# Patient Record
Sex: MALE | Race: WHITE | NOT HISPANIC OR LATINO | Employment: OTHER | URBAN - METROPOLITAN AREA
[De-identification: names, ages, dates, MRNs, and addresses within clinical notes are randomized per-mention and may not be internally consistent; named-entity substitution may affect disease eponyms.]

---

## 2019-01-21 ENCOUNTER — APPOINTMENT (OUTPATIENT)
Dept: LAB | Facility: CLINIC | Age: 39
End: 2019-01-21
Payer: COMMERCIAL

## 2019-01-21 ENCOUNTER — OFFICE VISIT (OUTPATIENT)
Dept: INTERNAL MEDICINE CLINIC | Facility: CLINIC | Age: 39
End: 2019-01-21
Payer: COMMERCIAL

## 2019-01-21 VITALS
BODY MASS INDEX: 27.4 KG/M2 | HEIGHT: 70 IN | SYSTOLIC BLOOD PRESSURE: 126 MMHG | HEART RATE: 90 BPM | WEIGHT: 191.4 LBS | DIASTOLIC BLOOD PRESSURE: 88 MMHG | OXYGEN SATURATION: 96 %

## 2019-01-21 DIAGNOSIS — F52.4 PREMATURE EJACULATION: ICD-10-CM

## 2019-01-21 DIAGNOSIS — R73.01 IMPAIRED FASTING GLUCOSE: ICD-10-CM

## 2019-01-21 DIAGNOSIS — E66.3 OVERWEIGHT (BMI 25.0-29.9): ICD-10-CM

## 2019-01-21 DIAGNOSIS — G47.00 INSOMNIA, UNSPECIFIED TYPE: ICD-10-CM

## 2019-01-21 DIAGNOSIS — Z13.6 SCREENING FOR CARDIOVASCULAR CONDITION: ICD-10-CM

## 2019-01-21 DIAGNOSIS — R35.1 NOCTURIA: ICD-10-CM

## 2019-01-21 DIAGNOSIS — G47.19 DAYTIME HYPERSOMNOLENCE: ICD-10-CM

## 2019-01-21 DIAGNOSIS — R35.1 NOCTURIA: Primary | ICD-10-CM

## 2019-01-21 LAB
ALBUMIN SERPL BCP-MCNC: 4.5 G/DL (ref 3.5–5)
ALP SERPL-CCNC: 74 U/L (ref 46–116)
ALT SERPL W P-5'-P-CCNC: 44 U/L (ref 12–78)
ANION GAP SERPL CALCULATED.3IONS-SCNC: 6 MMOL/L (ref 4–13)
AST SERPL W P-5'-P-CCNC: 33 U/L (ref 5–45)
BILIRUB SERPL-MCNC: 0.34 MG/DL (ref 0.2–1)
BUN SERPL-MCNC: 24 MG/DL (ref 5–25)
CALCIUM SERPL-MCNC: 8.8 MG/DL (ref 8.3–10.1)
CHLORIDE SERPL-SCNC: 102 MMOL/L (ref 100–108)
CHOLEST SERPL-MCNC: 193 MG/DL (ref 50–200)
CO2 SERPL-SCNC: 28 MMOL/L (ref 21–32)
CREAT SERPL-MCNC: 1.4 MG/DL (ref 0.6–1.3)
ERYTHROCYTE [DISTWIDTH] IN BLOOD BY AUTOMATED COUNT: 11.9 % (ref 11.6–15.1)
EST. AVERAGE GLUCOSE BLD GHB EST-MCNC: 128 MG/DL
GFR SERPL CREATININE-BSD FRML MDRD: 64 ML/MIN/1.73SQ M
GLUCOSE SERPL-MCNC: 79 MG/DL (ref 65–140)
HBA1C MFR BLD: 6.1 % (ref 4.2–6.3)
HCT VFR BLD AUTO: 47.6 % (ref 36.5–49.3)
HDLC SERPL-MCNC: 73 MG/DL (ref 40–60)
HGB BLD-MCNC: 16 G/DL (ref 12–17)
LDLC SERPL CALC-MCNC: 91 MG/DL (ref 0–100)
MCH RBC QN AUTO: 31.6 PG (ref 26.8–34.3)
MCHC RBC AUTO-ENTMCNC: 33.6 G/DL (ref 31.4–37.4)
MCV RBC AUTO: 94 FL (ref 82–98)
NONHDLC SERPL-MCNC: 120 MG/DL
PLATELET # BLD AUTO: 230 THOUSANDS/UL (ref 149–390)
PMV BLD AUTO: 10.3 FL (ref 8.9–12.7)
POTASSIUM SERPL-SCNC: 4.6 MMOL/L (ref 3.5–5.3)
PROT SERPL-MCNC: 7.5 G/DL (ref 6.4–8.2)
RBC # BLD AUTO: 5.07 MILLION/UL (ref 3.88–5.62)
SODIUM SERPL-SCNC: 136 MMOL/L (ref 136–145)
TRIGL SERPL-MCNC: 144 MG/DL
WBC # BLD AUTO: 8.81 THOUSAND/UL (ref 4.31–10.16)

## 2019-01-21 PROCEDURE — 85027 COMPLETE CBC AUTOMATED: CPT

## 2019-01-21 PROCEDURE — 99203 OFFICE O/P NEW LOW 30 MIN: CPT | Performed by: INTERNAL MEDICINE

## 2019-01-21 PROCEDURE — 83036 HEMOGLOBIN GLYCOSYLATED A1C: CPT

## 2019-01-21 PROCEDURE — 36415 COLL VENOUS BLD VENIPUNCTURE: CPT

## 2019-01-21 PROCEDURE — 80053 COMPREHEN METABOLIC PANEL: CPT

## 2019-01-21 PROCEDURE — 3725F SCREEN DEPRESSION PERFORMED: CPT | Performed by: INTERNAL MEDICINE

## 2019-01-21 PROCEDURE — 80061 LIPID PANEL: CPT

## 2019-01-21 RX ORDER — NICOTINE 21 MG/24HR
1 PATCH, TRANSDERMAL 24 HOURS TRANSDERMAL EVERY 24 HOURS
COMMUNITY
End: 2021-08-10 | Stop reason: CLARIF

## 2019-01-21 NOTE — PATIENT INSTRUCTIONS
Cholesterol and Your Health   AMBULATORY CARE:   Cholesterol  is a waxy, fat-like substance  Cholesterol is made by your body, but also comes from certain foods you eat  Your body uses cholesterol to make hormones and new cells  Your body also uses cholesterol to protect nerves  Cholesterol comes from foods such as meat and dairy products  Your total cholesterol level is made up by LDL cholesterol, HDL cholesterol, and triglycerides:  · LDL cholesterol  is called bad cholesterol  because it forms plaque in your arteries  As plaque builds up, your arteries become narrow, and less blood flows through  When plaque decreases blood flow to your heart, you may have chest pain  If plaque completely blocks an artery that bring blood to your heart, you may have a heart attack  Plaque can break off and form blood clots  Blood clots may block arteries in your brain and cause a stroke  · HDL cholesterol  is called good cholesterol  because it helps remove LDL cholesterol from your arteries  It does this by attaching to LDL cholesterol and carrying it to your liver  Your liver breaks down LDL cholesterol so your body can get rid of it  High levels of HDL cholesterol can help prevent a heart attack and stroke  Low levels of HDL cholesterol can increase your risk for heart disease, heart attack, and stroke  · Triglycerides  are a type of fat that store energy from foods you eat  High levels of triglycerides also cause plaque buildup  This can increase your risk for a heart attack or stroke  If your triglyceride level is high, your LDL cholesterol level may also be high  How food affects your cholesterol levels:   · Unhealthy fats  increase LDL cholesterol and triglyceride levels in your blood  They are found in foods high in cholesterol, saturated fat, and trans fat:     ¨ Cholesterol  is found in eggs, dairy, and meat  ¨ Saturated fat  is found in butter, cheese, ice cream, whole milk, and coconut oil  Saturated fat is also found in meat, such as sausage, hot dogs, and bologna  ¨ Trans fat  is found in liquid oils and is used in fried and baked foods  Foods that contain trans fats include chips, crackers, muffins, sweet rolls, microwave popcorn, and cookies  · Healthy fats,  also called unsaturated fats, help lower LDL cholesterol and triglyceride levels  Healthy fats include the following:     ¨ Monounsaturated fats  are found in foods such as olive oil, canola oil, avocado, nuts, and olives  ¨ Polyunsaturated fats,  such as omega 3 fats, are found in fish, such as salmon, trout, and tuna  They can also be found in plant foods such as flaxseed, walnuts, and soybeans  Other things that affect your cholesterol levels:   · Smoking cigarettes    · Being overweight or obese     · Drinking large amounts of alcohol    · Not enough exercise or no exercise    · Certain genes passed from your parents to you  What you need to know about having your cholesterol levels checked: Adults 21to 39years of age should have their cholesterol levels checked every 4 to 6 years  Adults 45 years and older should have their cholesterol checked every 1 to 2 years  You may need your cholesterol checked more often, or at a younger age, if you have risk factors for heart disease  You may also need to have your cholesterol checked more often if you have other health conditions, such as diabetes  Blood tests are used to check cholesterol levels  Blood tests measure your levels of triglycerides, LDL cholesterol, and HDL cholesterol  Cholesterol level goals: Your cholesterol level goal may depend on your risk for heart disease  It may also depend on your age and other health conditions  Ask your healthcare provider if the following goals are right for you:  · Your total cholesterol level  should be less than 200 mg/dL  This number may also depend on your HDL and LDL cholesterol goals       · Your LDL cholesterol level  should be less than 130 mg/dL  · Your HDL cholesterol level  should be 60 mg/dL or higher  · Your triglyceride level  should be less than 150 mg/dL  Treatment for high cholesterol:  Treatment for high cholesterol will also decrease your risk of heart disease, heart attack, and stroke  Treatment may include any of the following:  · Medicines  may be given to lower your LDL cholesterol, triglyceride levels, or total cholesterol level  You may need medicines to lower your cholesterol if any of the following is true:     ¨ You have a history of stroke, TIA, unstable angina, or a heart attack    ¨ Your LDL cholesterol level is 190 mg/dL or higher    ¨ You are age 36to 76years of age, have diabetes, and your LDL cholesterol is 70 mg/dL or higher    ¨ You are age 36to 76years of age, have risk factors for heart disease, and your LDL cholesterol is 70 mg/dL or higher    · Lifestyle changes  include changes to your diet, exercise, weight loss, and quitting smoking  It also includes decreasing the amount of alcohol you drink  · Supplements  include fish oil, red yeast rice, and garlic  Fish oil may help lower your triglyceride and LDL cholesterol levels  It may also increase your HDL cholesterol level  Red yeast rice may help decrease your total cholesterol level and LDL cholesterol level  Garlic may help lower your total cholesterol level  Do not take these supplements without talking to your healthcare provider  Nutrition to help lower your cholesterol levels:  A registered dietitian can help you create a healthy eating plan  Read food labels and choose foods low in saturated fat, trans fats, and cholesterol  · Decrease the total amount of fat you eat  Choose lean meats, fat-free or 1% fat milk, and low-fat dairy products, such as yogurt and cheese  Try to limit or avoid red meats  Limit or do not eat fried foods or baked goods such as cookies  · Replace unhealthy fats with healthy fats    Cook foods in olive oil or canola oil  Choose soft margarines that are low in saturated fat and trans fat  Seeds, nuts, and avocados are other examples of healthy fats  · Eat foods with omega-3 fats  Examples include salmon, tuna, mackerel, walnuts, and flaxseed  Eat fish 2 times per week  Children and pregnant women should not eat fish that have high levels of mercury, such as shark, swordfish, and cherise mackerel  · Increase the amount of plant-based foods you eat  Plant-based foods are low in cholesterol and fat  Eating more of these foods may help lower your cholesterol and help you lose weight  Examples of plant-based foods includes fruits, vegetables, legumes, and whole grains  Replace milk that contains dairy with almond, soy, or coconut milk  Eat beans and foods with soy for protein instead of meat  Ask your healthcare provider or dietitian for more information on plant-based foods  · Increase the amount of fiber you eat  High-fiber foods can help lower your LDL cholesterol  You should eat between 20 and 30 grams of fiber each day  Eat at least 5 servings of fruits and vegetables each day  Other examples of high-fiber foods include whole-grain or whole-wheat breads, pastas, or cereals, and brown rice  Eat 3 ounces of whole-grain foods each day  Increase fiber slowly  You may have abdominal discomfort, bloating, and gas if you add fiber to your diet too quickly  Lifestyle changes you can make to help lower your cholesterol levels:   · Maintain a healthy weight  Ask your healthcare provider how much you should weigh  Ask him or her to help you create a weight loss plan if you are overweight  Weight loss can decrease your total cholesterol and triglyceride levels  · Exercise regularly  Exercise can help lower your total cholesterol level and maintain a healthy weight  Exercise can also help increase your HDL cholesterol level   Work with your healthcare provider to create an exercise program that is right for you  Get at least 30 minutes of moderate exercise most days of the week  Examples of exercise include brisk walking, swimming, or biking  · Do not smoke  Nicotine and other chemicals in cigarettes and cigars can damage your lungs, heart, and blood vessels  They can also raise your triglyceride levels  Ask your healthcare provider for information if you currently smoke and need help to quit  E-cigarettes or smokeless tobacco still contain nicotine  Talk to your healthcare provider before you use these products  · Limit or do not drink alcohol  Alcohol can increase your triglyceride levels  Ask your healthcare provider if it is safe for you to drink alcohol  Also ask how much is safe for you to drink each day  © 2017 [facility] Information is for End User's use only and may not be sold, redistributed or otherwise used for commercial purposes  All illustrations and images included in CareNotes® are the copyrighted property of [employer]  or Jak Espino  The above information is an  only  It is not intended as medical advice for individual conditions or treatments  Talk to your doctor, nurse or pharmacist before following any medical regimen to see if it is safe and effective for you

## 2019-01-21 NOTE — PROGRESS NOTES
INTERNAL MEDICINE INITIAL OFFICE VISIT  [facility] [address] [place] [address]    NAME: Lisbeth Balderas  AGE: 41 y o  SEX: male  : 1980     DATE: 2019     Assessment and Plan:     1  Nocturia    Discussed possible causes  Will run basic lab workup  May also need to evaluate for underlying CEASAR with sleep study  - Ambulatory referral to Urology; Future  - CBC; Future  - Comprehensive metabolic panel; Future    2  Premature ejaculation    Referral to urology  Also recommended psychotherapy  Potentially consider use of SSRI therapy  - Ambulatory referral to Urology; Future  - CBC; Future  - Comprehensive metabolic panel; Future    3  Impaired fasting glucose    Evaluate for underlying diabetes  Patient has been consuming more soda lately     - HEMOGLOBIN A1C W/ EAG ESTIMATION; Future  - CBC; Future  - Comprehensive metabolic panel; Future    4  Overweight (BMI 25 0-29  9)    Body mass index is 27 86 kg/m²  Discussed the patient's BMI with him  The BMI is above average  BMI counseling and education was provided to the patient  Nutrition recommendations include reducing portion sizes, decreasing overall calorie intake, 3-5 servings of fruits/vegetables daily and increasing intake of lean protein  Exercise recommendations include moderate aerobic physical activity for 150 minutes/week and exercising 3-5 times per week  5  Screening for cardiovascular condition  - Lipid panel; Future    6  Daytime hypersomnolence  7  Insomnia, unspecified type    Check blood work  Based on results, may consider sleep study  Chief Complaint:     Chief Complaint   Patient presents with    Establish Care     new pt    Insomnia     cannot sleep      History of Present Illness:     41year old male with prior history of substance abuse presents to establish care due to insomnia/nocturia and premature ejaculation       #1 Insomnia - patient states he sleeps poorly because he is getting up around every 2 hours to use the bathroom  He doesn't drink any caffeine or stimulants before bedtime  Tries to stop drinking around 6 pm  Previous alcohol use, but since being clean off drugs, he stopped consuming alcohol as well  No family history of diabetes  Admits since he stopped drinking alcohol, he has been drinking a lot of soda (sprite)  Feels like he empties his bladder fully  No hesitancy or urgency  No prostatitis  Has daytime hypersomnolence, intermittent morning headaches  Unsure if he snores loudly  #2 Premature ejaculation - this is not a new problem for him  Has been going on for several years  Causes him performance anxiety  The following portions of the patient's history were reviewed and updated as appropriate: allergies, current medications, past family history, past medical history, past social history, past surgical history and problem list      Review of Systems:     Review of Systems   Constitutional: Negative for activity change, appetite change and fatigue  Respiratory: Negative for apnea, cough, chest tightness, shortness of breath and wheezing  Cardiovascular: Negative for chest pain, palpitations and leg swelling  Gastrointestinal: Negative for abdominal distention, abdominal pain, blood in stool, constipation, diarrhea, nausea and vomiting  Genitourinary: Negative for difficulty urinating, dysuria, enuresis, frequency and urgency  Nocturia  Premature ejaculation   Musculoskeletal: Negative for arthralgias, back pain, gait problem, joint swelling and myalgias  Skin: Negative for rash and wound  Neurological: Negative for dizziness, weakness, light-headedness, numbness and headaches  Psychiatric/Behavioral: Negative for behavioral problems, confusion, hallucinations, sleep disturbance and suicidal ideas  The patient is not nervous/anxious  Past Medical History:   History reviewed  No pertinent past medical history       Past Surgical History:     Past Surgical History:   Procedure Laterality Date    WISDOM TOOTH EXTRACTION        Social History:   He reports that he has been smoking Cigarettes  He started smoking about 19 years ago  He has been smoking about 1 00 pack per day  He has never used smokeless tobacco  He reports that he does not drink alcohol or use drugs  Family History:     Family History   Problem Relation Age of Onset    Cancer Maternal Grandmother       Current Medications:     Current Outpatient Prescriptions:     nicotine (NICODERM CQ) 14 mg/24hr TD 24 hr patch, Place 1 patch on the skin every 24 hours, Disp: , Rfl:     nicotine (NICODERM CQ) 21 mg/24 hr TD 24 hr patch, Place 1 patch on the skin every 24 hours, Disp: , Rfl:      Allergies:   No Known Allergies     Physical Exam:     /88 (BP Location: Left arm, Patient Position: Sitting, Cuff Size: Standard)   Pulse 90   Ht 5' 9 5" (1 765 m)   Wt 86 8 kg (191 lb 6 4 oz)   SpO2 96%   BMI 27 86 kg/m²     Physical Exam   Constitutional: He is oriented to person, place, and time  He appears well-developed and well-nourished  No distress  Eyes: Conjunctivae are normal  Right eye exhibits no discharge  Left eye exhibits no discharge  No scleral icterus  Neck: Neck supple  No JVD present  No thyromegaly present  Cardiovascular: Normal rate, regular rhythm, normal heart sounds and intact distal pulses  Exam reveals no gallop and no friction rub  No murmur heard  Pulmonary/Chest: Effort normal and breath sounds normal  No respiratory distress  He has no wheezes  He has no rales  He exhibits no tenderness  Abdominal: Soft  Bowel sounds are normal  He exhibits no distension and no mass  There is no tenderness  There is no rebound and no guarding  Musculoskeletal: Normal range of motion  He exhibits no edema  Lymphadenopathy:     He has no cervical adenopathy  Neurological: He is alert and oriented to person, place, and time  Skin: Skin is warm and dry   He is not diaphoretic  Psychiatric: He has a normal mood and affect  His behavior is normal    Vitals reviewed      Danya Quinn DO  [facility]

## 2019-01-22 ENCOUNTER — TELEPHONE (OUTPATIENT)
Dept: INTERNAL MEDICINE CLINIC | Facility: CLINIC | Age: 39
End: 2019-01-22

## 2019-01-22 DIAGNOSIS — G47.19 DAYTIME HYPERSOMNOLENCE: ICD-10-CM

## 2019-01-22 DIAGNOSIS — R35.1 NOCTURIA: Primary | ICD-10-CM

## 2019-01-22 DIAGNOSIS — G47.00 INSOMNIA, UNSPECIFIED TYPE: ICD-10-CM

## 2019-01-22 NOTE — TELEPHONE ENCOUNTER
----- Message from Luis Alvarez DO sent at 1/22/2019  6:54 AM EST -----  Call patient and let him know labs show normal cholesterol, normal electrolytes, and normal complete blood count  He does have evidence of pre-diabetes  A normal A1C is <5 7% and his was 6 1% which is in the pre-diabetes range  Diabetes is when you reach A1C of 6 5%  Do not feel pre-diabetes is causing his nocturia however  I am going to try to get a sleep study approved through his insurance

## 2019-01-22 NOTE — TELEPHONE ENCOUNTER
ROBELI: Dr Nella Gaucher:    Tomer Darby no longer coves Home Sleep Studies; even with an [employer]  You have to order a Diagnostic Sleep Study; CPT code: 77506    Please let me know how you'd like to handle this- order a Diagnostic Sleep Study or refer to a specialist; such as Dr [place] to handle his sleep issues  If you're ordering the Daignostic Study; please put it in the system

## 2019-01-25 ENCOUNTER — TELEPHONE (OUTPATIENT)
Dept: SLEEP CENTER | Facility: CLINIC | Age: 39
End: 2019-01-25

## 2019-01-25 NOTE — TELEPHONE ENCOUNTER
----- Message from Kg Cruz DO sent at 1/24/2019  5:03 PM EST -----  Chart reviewed  Study approved   ----- Message -----  From: Lisbeth Benton  Sent: 1/23/2019  11:45 AM  To: Sleep Medicine Thelma Provider    This sleep study needs approval      If approved please sign and return to clerical pool  If denied please include reasons why  Also provide alternative testing if warranted  Please sign and return to clerical pool

## 2019-01-30 NOTE — PROGRESS NOTES
Problem List Items Addressed This Visit     Premature ejaculation     Patient complains of a longstanding history of reaching climax within 1 5 to 2 minutes, this is also true with masturbation and after initial ejaculation (a refractory period  Does not help with his premature ejaculation)  He has tried the withdrawal technique another behavioral therapies and previously used drugs to increase his ejaculatory latency  He has been cleaning up his behaviors and making positive changes in his life and as such wishes to find a appropriate therapy for this complaint  I spoke to him about off-label use of selective serotonin reuptake inhibitors and the risks and benefits of this approach  He does not wish to use these on a daily basis but will use them on a p r n  Basis  Plan:  Off-label use of paroxetine 40 milligrams on an as-needed basis on the morning of attempted sexual encounters             Relevant Medications    PARoxetine (PAXIL) 40 MG tablet    Nocturia     Patient with nocturia 3-4 times per night, usually awakening every 2 hours  He identifies no aggravating or alleviating factors, this has been occurring for approximately 4 months and is affecting his quality of life  Discussed with patient multifactorial nature of nocturia  Discussed fluid modification, avoidance of bladder irritants especially at night, leg elevation for 1 hour prior to bed with a pre bed void, appropriate timing of medication intake    Discussed the need for multimodal therapy in certain cases of nocturia    Also discussed with patient incorporating exercise as able in addition to incorporating good sleep hygiene    Plan:  I have given the patient a voiding diary, he will continue behavioral changes, he will go for his sleep study to rule out sleep apnea as a contributing factor to his nocturia    Should his voiding diary show nocturnal polyuria, we will investigate not even to increase his 1st uninterrupted interval of sleep         Relevant Orders    POCT Measure PVR (Completed)    Erectile dysfunction due to arterial insufficiency - Primary     Patient has previously used over-the-counter remedies to help with his erectile function  Today we discussed the normal physiology of erection and ejaculation and the complex nature of human sexuality and arousal     We talked about behavioral contribution to poor sexual function including the intake of a poor diet, poor physical activity, and smoking and tobacco use  We talked about the benefits of dietary changes increased physical activity, and avoiding other untoward behaviors which decrease sexual function  With regard to treatments for erectile dysfunction we talked about oral therapies in the form of phosphodiesterase 5 inhibitors, as well as injection therapies with vaso active medications, and surgical therapies with penile prostheses  Specifically, with regard to oral therapies we talked about the need to avoid nitrate medications, the potential interaction with alpha blockers and other blood pressure medications, and the appropriate timing of oral medications approximately 1 hour prior to intercourse on a relatively empty stomach without heavy fatty food or alcohol intake  We talked about the need to take these medications 6-7 times, appropriately, prior to determining treatment failure  We also discussed the potential risks of facial flushing, gastroesophageal reflux symptoms, changes in vision and vision color, and other potential side effects of these medications      Plan:  As needed phosphodiesterase 5 inhibition                 Relevant Medications    sildenafil (REVATIO) 20 mg tablet                Assessment and plan:       Please see problem oriented charting for the assessment plan of today's urological complaints    Yuri Bay MD      Chief Complaint     Chief Complaint   Patient presents with    Nocturia    Erectile dysfunction and concerns  Premature ejaculation    History of Present Illness     Analisa Mcrae is a 41 y o  gentleman referred in consultation by Dr Abril Alcala for the above urologic complaints  A copy of today's consultation is being sent to the referring provider in the name of continuity of care  With regard to his nocturia symptoms going on for approximately 4 months, he awakens every 2 hours to void, and sometimes has difficulties going back to sleep  He has limited is evening fluid intake, he denies regular exercise, and I counseled him on good sleeping hygiene  He is pending a sleep study and this is certainly reasonable given the potential for occult sleep apnea contributing to nocturnal over production of urine  He identifies no aggravating or alleviating factors and no other associated symptoms with regard to this complaint  It is affecting his quality of life negatively  With regard to his fluid intake he does take 2 cups of coffee in the morning as well as some tea throughout the day  He does smoke, for 20 years, [place], menthol cigarettes  With regard his premature ejaculation he states that for quite some time he has ejaculated between 1 5-2 minutes after intromission and he has tried pulling out and the squeeze technique previously without much success  This also occurs when he masturbates  He has had 21 lifetime sexual partners, all female  He states that previously he used to take pain killers to increase his ejaculatory latency but he is in recovery at this time  He does not have an interesting using numbing creams at this time but is interested in using as needed, off-label SSRI therapy for premature ejaculation  At the end of today's visit he did inquire as to some Viagra for erectile troubles and I counseled him on this and taken just medication      On review of systems today he denies dysuria, incontinence, hesitancy of urination, gross hematuria, urgency, nocturia is present as listed above, he feels empty after voiding and stream quality is good  Postvoid residual urine volume is 54 milliliters indicating no evidence of overflow incontinence    The following portions of the patient's history were reviewed and updated as appropriate: allergies, current medications, past family history, past medical history, past social history, past surgical history and problem list         Detailed Urologic History     - please refer to HPI    Review of Systems     Review of Systems   Constitutional: Negative  HENT: Negative  Eyes: Negative  Respiratory: Negative  Cardiovascular: Negative  Gastrointestinal: Negative  Endocrine: Negative  Genitourinary:        As per HPI   Musculoskeletal: Negative  Skin: Negative  Allergic/Immunologic: Negative  Neurological: Negative  Hematological: Negative  Psychiatric/Behavioral: Negative  Allergies     No Known Allergies    Physical Exam     Physical Exam   Constitutional: He is oriented to person, place, and time  He appears well-developed and well-nourished  No distress  HENT:   Head: Normocephalic and atraumatic  Right Ear: External ear normal    Left Ear: External ear normal    Nose: Nose normal    Eyes: Pupils are equal, round, and reactive to light  Conjunctivae and EOM are normal  Right eye exhibits no discharge  Left eye exhibits no discharge  No scleral icterus  Neck: Normal range of motion  Neck supple  Cardiovascular: Regular rhythm and intact distal pulses  Pulmonary/Chest: Effort normal  No stridor  No respiratory distress  He has no wheezes  Abdominal: Soft  He exhibits no distension and no mass  There is no tenderness  There is no rebound and no guarding  No hernia  Genitourinary: Rectum normal, prostate normal and penis normal    Genitourinary Comments: Prostate is 20 grams, smooth, no nodules   Musculoskeletal: He exhibits no edema, tenderness or deformity     Neurological: He is alert and oriented to person, place, and time  No cranial nerve deficit or sensory deficit  He exhibits normal muscle tone  Coordination normal    Skin: Skin is warm and dry  No rash noted  He is not diaphoretic  No erythema  No pallor  Psychiatric: He has a normal mood and affect  His behavior is normal  Judgment and thought content normal    Nursing note and vitals reviewed            Vital Signs  Vitals:    01/31/19 0836   BP: 140/84   BP Location: Left arm   Patient Position: Sitting   Cuff Size: Standard   Pulse: 97   Weight: 86 2 kg (190 lb)   Height: 5' 9 5" (1 765 m)         Current Medications       Current Outpatient Prescriptions:     acetaminophen (TYLENOL) 500 mg tablet, Take 500 mg by mouth every 6 (six) hours as needed, Disp: , Rfl:     nicotine (NICODERM CQ) 14 mg/24hr TD 24 hr patch, Place 1 patch on the skin every 24 hours, Disp: , Rfl:     nicotine (NICODERM CQ) 21 mg/24 hr TD 24 hr patch, Place 1 patch on the skin every 24 hours, Disp: , Rfl:     PARoxetine (PAXIL) 40 MG tablet, Take 1 tablet (40 mg total) by mouth see administration instructions Take daily as needed, Disp: 30 tablet, Rfl: 3    sildenafil (REVATIO) 20 mg tablet, Take 2-5 tablets as needed daily for intercourse, Disp: 60 tablet, Rfl: 6      Active Problems     Patient Active Problem List   Diagnosis    Premature ejaculation    Nocturia    Erectile dysfunction due to arterial insufficiency         Past Medical History     Past Medical History:   Diagnosis Date    Substance abuse ([facility] [address] )          Surgical History     Past Surgical History:   Procedure Laterality Date    WISDOM TOOTH EXTRACTION           Family History     Family History   Problem Relation Age of Onset    Cancer Maternal Grandmother          Social History     Social History     History   Smoking Status    Current Every Day Smoker    Packs/day: 1 00    Types: Cigarettes    Start date: 1/1/2000   Smokeless Tobacco    Never Used         Pertinent Lab Values Lab Results   Component Value Date    CREATININE 1 40 (H) 01/21/2019       No results found for: PSA          Pertinent Imaging      There is no pertinent urological imaging for my review

## 2019-01-30 NOTE — PATIENT INSTRUCTIONS
Premature Ejaculation    Premature ejaculation is the term used when a man comes (ejaculates) more quickly than he and/or his partner would like  It means you ejaculate very soon after putting your penis inside (penetrating) your partner, or even before penetration  It is not really known what causes premature ejaculation  Men with premature ejaculation should not be embarrassed about discussing it with their doctor, as it can be helped by a variety of means  There are tablets which may be helpful, either taken regularly or as needed  Creams or sprays that numb the penis may also be used  Other treatments include certain techniques used during sex, and psychological treatments  Premature ejaculation occurs when you come (achieve orgasm) soon after, or even before, putting your penis inside your partner  Doctors use three features to decide whether a person has premature ejaculation  These are:    Ejaculation occurs always or nearly always within a minute of penetration, and always has done since first having sex  (Or up to three minutes if it is a new problem which you have not previously experienced )  You feel you are always or usually unable to delay ejaculation  You find sex frustrating or distressing and tend to avoid it, or the issue is affecting your relationship or your life  How common is premature ejaculation? Many men do not seek help from their doctor for this problem so it is not known how common it is  Surveys that have been done suggest it affects about one to three out of every one [de-identified] men  Some studies suggest it can be as common as thirty in a [de-identified] men  What causes premature ejaculation? It is not well understood what causes premature ejaculation and in most cases doctors do not know  Sometimes one or more of the following may be a cause      It is more likely to happen if you are young and in the early stages of a relationship, in which case it often gets better with time     Factors such as anxiety about sex or your feelings during your first sexual experience may contribute  Some medicines (eg, cabergoline used for the treatment of illnesses such as Parkinson's disease) can possibly cause premature ejaculation  Premature ejaculation can be caused by some recreational drugs such as cocaine and amphetamines  Persistent infection or inflammation of the prostate gland (chronic prostatitis) is known to be sometimes associated with premature ejaculation  Nervous system diseases, such as multiple sclerosis and peripheral neuropathy, can also be a cause  Multiple sclerosis is a condition resulting from unwinding of the covering of nerves and peripheral neuropathy is a condition where there is damage to the nerves supplying the peripheral nerves of the body  What is the treatment for premature ejaculation? General advice    Don't be embarrassed to discuss the problem with your doctor, either at your surgery or at a sexual health clinic  Premature ejaculation is sometimes temporary and in some cases gets better on its own  You may find that increasing the frequency of sex (either intercourse or masturbation) solves the problem  After one orgasm, it is normal for the next one to take a little longer  Some men find it helpful to masturbate first (with or without their partner) so it takes longer to have an orgasm whilst having sex  Wearing a condom reduces sensation and this may be helpful  Premature ejaculation may be less likely if you have sex with your partner on top    You may want to try the 'squeeze technique'  Just before ejaculation, the head of the penis should be squeezed for 1-20 seconds  Either you or your partner can do this  The squeezing is usually done during masturbation (stimulation) of the penis but also can be done by stopping during intercourse  The squeezing reduces an erection and delays your orgasm   The process must be repeated three times before having an orgasm  It requires a lot of practice  The 'start-stop' technique is similar but you simply stop the stimulation or the intercourse just before ejaculating  Wait for your erection to subside a little, before carrying on  Again, the idea is to repeat three times before having an orgasm  You need practice to recognise the moment just before an orgasm in order to be able to stop in time  Psychological treatments are sometimes used but no one is sure just how effective they are  Studies have shown these techniques can be effective but results are variable  You may prefer to try a cream or a tablet, as discussed below  Medication for premature ejaculation    Various medicines have been found to be helpful  These include:    Selective serotonin reuptake inhibitor (SSRI) antidepressants such as paroxetine, citalopram, escitalopram, fluoxetine and sertraline  You need to take these daily for at least one or two weeks to get the full effect and you may find they start to wear off after 6-12 months  If you cannot tolerate the side-effects of SSRIs (which can include sickness, dizziness and drowsiness), you could try taking another antidepressant called clomipramine just when you are going to have sex  Medicines used for erection problems, such as sildenafil, may sometimes also be useful for premature ejaculation  It is sometimes taken in combination with an SSRI  If you don't want to take tablets, local anaesthetic creams and sprays are available which help to reduce the sensitivity of the penis  Surgery has occasionally been helpful in men who have a short frenulum  This is the bridge of skin joining the head of the penis to the shaft  It is not a common treatment for premature ejaculation  In summary, this is a common problem which often gets better with time and gets better with the above treatments    Do not lose hope as in most cases sexual partners can find ways to satisfy one another and overcome this common issue     What is nocturia? Nocturia is waking up one or more times during the night because of the need to urinate  This means that if you wake up during the night--for instance because you are thirsty, hear noises, worry, or feel pain--and you decide to visit the toilet in the meantime, you do not have nocturia  You also do not suffer from nocturia if you go to the toilet first thing in the morning  Waking up once in a while to urinate is common and is generally not very bothersome  However, if you regularly wake up two or more times a night, it can affect your quality of life and general health  The more times you wake up each night, the more it impacts your wellbeing  Nocturia can disrupt your sleep and may cause you to be more tired than usual during the day  This can make it difficult to concentrate at work and carry out your daily activities  Your lower energy levels could also affect your social life  Interesting Fact:    While nocturia literally means urination at night, it may also occur during the day, for those who work night shifts and sleep in the daytime  How common is nocturia? Nocturia affects both men and women, and becomes more common as you grow older  In adults under 27, more women than men suffer from nocturia while over the age of 48, it affects men more often  Over the age of 61, the chances of suffering from nocturia rapidly increase for men and women alike  What causes nocturia? In some people the kidneys produce too much urine  If the kidneys only overproduce at night, this is called nocturnal polyuria  There are several conditions which can cause overproduction of urine, such as diabetes, or primary polydipsia, the sensation that your mouth is dry which leads you to drink too much      Some people have a smaller bladder which is filled to capacity more quickly and cannot store the urine all night    If you have a bladder or prostate condition, such as benign prostatic enlargement (BPE), you may not be able to empty your bladder completely before going to bed  As a result, the bladder fills more quickly and may not store the urine all night     Other possible causes of nocturia are:    Overactive Bladder    A decrease in the production of the hormone vasopressin (also known as antidiuretic hormone)  Obstructive sleep apnea, or snoring (contraction of the diaphragm pulls on the heart and causes release of atrial natriuretic peptide, a hormone which causes more salt to be excreted in the urine which also creates more urine)  If you snore or wake up gasping for air please ask your primary care doctor about a sleep study to diagnose you with potential sleep apnea  Treatment of sleep apnea with a CPAP machine (a machine that provide continuous positive airway pressure to keep your airway open) can greatly decrease nocturia  Swelling of the ankles and legs, a condition known as peripheral edema    Lower urinary tract symptoms (LUTS)    Congestive heart failure    Treatments:  Fluid modification:  Only drink if you are thirsty after 6 o'clock p m  Avoidance of bladder irritants especially at night:  Alcohol, caffeine, spicy food, acidic food, and certain medications can irritate the bladder and decrease the bladder storage capacity  Leg elevation for 1 hour prior to bed with a pre bedtime void (trip to the bathroom to empty your bladder)    Appropriate timing of medication intake:  Do not take water pills or diuretics at night rather, try taking these at 12 o'clock noon    Incorporating exercise:   You should exercise 4 to 5 times a week for at least 30 minutes enough to raise your heart rate and break a sweat, only do this if your heart is healthy enough for exercise    Incorporate good sleep hygiene:  Sleep in a cool dark room, use and I mask and ear plugs to decrease light and sound pollution, do not exercise within 2-3 hours of trying to go to sleep, take a hot shower or hot bath prior to retiring to bed, the bed is for sleep and sex only, this means do not try to do work or other stimulating activities such as watching television when in bed    The treatment of nocturia is often difficult and multifactorial, meaning that medical therapy, behavioral therapy, and occasionally surgical therapies are necessary to decrease waking up at night to urinate  Many times, nighttime awakening scan be decreased but not eliminated and it is necessary to work with your urologist to find the best treatment regimen for you while also learning to cope with potential persistence of symptoms

## 2019-01-31 ENCOUNTER — OFFICE VISIT (OUTPATIENT)
Dept: UROLOGY | Facility: CLINIC | Age: 39
End: 2019-01-31
Payer: COMMERCIAL

## 2019-01-31 VITALS
DIASTOLIC BLOOD PRESSURE: 84 MMHG | HEART RATE: 97 BPM | SYSTOLIC BLOOD PRESSURE: 140 MMHG | BODY MASS INDEX: 27.2 KG/M2 | HEIGHT: 70 IN | WEIGHT: 190 LBS

## 2019-01-31 DIAGNOSIS — F52.4 PREMATURE EJACULATION: ICD-10-CM

## 2019-01-31 DIAGNOSIS — N52.01 ERECTILE DYSFUNCTION DUE TO ARTERIAL INSUFFICIENCY: Primary | ICD-10-CM

## 2019-01-31 DIAGNOSIS — R35.1 NOCTURIA: ICD-10-CM

## 2019-01-31 LAB — POST-VOID RESIDUAL VOLUME, ML POC: 54 ML

## 2019-01-31 PROCEDURE — 99244 OFF/OP CNSLTJ NEW/EST MOD 40: CPT | Performed by: UROLOGY

## 2019-01-31 PROCEDURE — 51798 US URINE CAPACITY MEASURE: CPT | Performed by: UROLOGY

## 2019-01-31 RX ORDER — SILDENAFIL CITRATE 20 MG/1
TABLET ORAL
Qty: 60 TABLET | Refills: 6 | Status: SHIPPED | OUTPATIENT
Start: 2019-01-31 | End: 2020-07-02

## 2019-01-31 RX ORDER — PAROXETINE HYDROCHLORIDE 40 MG/1
40 TABLET, FILM COATED ORAL SEE ADMIN INSTRUCTIONS
Qty: 30 TABLET | Refills: 3 | Status: SHIPPED | OUTPATIENT
Start: 2019-01-31 | End: 2020-07-02

## 2019-01-31 RX ORDER — ACETAMINOPHEN 500 MG
500 TABLET ORAL EVERY 6 HOURS PRN
COMMUNITY
Start: 2016-08-18 | End: 2021-08-10 | Stop reason: CLARIF

## 2019-01-31 NOTE — ASSESSMENT & PLAN NOTE
Patient complains of a longstanding history of reaching climax within 1 5 to 2 minutes, this is also true with masturbation and after initial ejaculation (a refractory period  Does not help with his premature ejaculation)  He has tried the withdrawal technique another behavioral therapies and previously used drugs to increase his ejaculatory latency  He has been cleaning up his behaviors and making positive changes in his life and as such wishes to find a appropriate therapy for this complaint  I spoke to him about off-label use of selective serotonin reuptake inhibitors and the risks and benefits of this approach  He does not wish to use these on a daily basis but will use them on a p r n  Basis      Plan:  Off-label use of paroxetine 40 milligrams on an as-needed basis on the morning of attempted sexual encounters

## 2019-01-31 NOTE — ASSESSMENT & PLAN NOTE
Patient with nocturia 3-4 times per night, usually awakening every 2 hours  He identifies no aggravating or alleviating factors, this has been occurring for approximately 4 months and is affecting his quality of life  Discussed with patient multifactorial nature of nocturia  Discussed fluid modification, avoidance of bladder irritants especially at night, leg elevation for 1 hour prior to bed with a pre bed void, appropriate timing of medication intake    Discussed the need for multimodal therapy in certain cases of nocturia    Also discussed with patient incorporating exercise as able in addition to incorporating good sleep hygiene    Plan:  I have given the patient a voiding diary, he will continue behavioral changes, he will go for his sleep study to rule out sleep apnea as a contributing factor to his nocturia    Should his voiding diary show nocturnal polyuria, we will investigate not even to increase his 1st uninterrupted interval of sleep

## 2019-01-31 NOTE — LETTER
January 31, 2019     Courtney Kessler DO  [address] [address]    Patient: Grupo Benavides   YOB: 1980   Date of Visit: 1/31/2019       Dear Dr Diego Jacobsen:    Thank you for referring Grupo Benavides to me for evaluation  Below are my notes for this consultation  If you have questions, please do not hesitate to call me  I look forward to following your patient along with you  Sincerely,        Hawa Griffin MD        CC: No Recipients  Hawa Griffin MD  1/31/2019  9:20 AM  Sign at close encounter       Problem List Items Addressed This Visit     Premature ejaculation     Patient complains of a longstanding history of reaching climax within 1 5 to 2 minutes, this is also true with masturbation and after initial ejaculation (a refractory period  Does not help with his premature ejaculation)  He has tried the withdrawal technique another behavioral therapies and previously used drugs to increase his ejaculatory latency  He has been cleaning up his behaviors and making positive changes in his life and as such wishes to find a appropriate therapy for this complaint  I spoke to him about off-label use of selective serotonin reuptake inhibitors and the risks and benefits of this approach  He does not wish to use these on a daily basis but will use them on a p r n  Basis  Plan:  Off-label use of paroxetine 40 milligrams on an as-needed basis on the morning of attempted sexual encounters             Relevant Medications    PARoxetine (PAXIL) 40 MG tablet    Nocturia     Patient with nocturia 3-4 times per night, usually awakening every 2 hours  He identifies no aggravating or alleviating factors, this has been occurring for approximately 4 months and is affecting his quality of life  Discussed with patient multifactorial nature of nocturia      Discussed fluid modification, avoidance of bladder irritants especially at night, leg elevation for 1 hour prior to bed with a pre bed void, appropriate timing of medication intake    Discussed the need for multimodal therapy in certain cases of nocturia    Also discussed with patient incorporating exercise as able in addition to incorporating good sleep hygiene    Plan:  I have given the patient a voiding diary, he will continue behavioral changes, he will go for his sleep study to rule out sleep apnea as a contributing factor to his nocturia  Should his voiding diary show nocturnal polyuria, we will investigate not even to increase his 1st uninterrupted interval of sleep         Relevant Orders    POCT Measure PVR (Completed)    Erectile dysfunction due to arterial insufficiency - Primary     Patient has previously used over-the-counter remedies to help with his erectile function  Today we discussed the normal physiology of erection and ejaculation and the complex nature of human sexuality and arousal     We talked about behavioral contribution to poor sexual function including the intake of a poor diet, poor physical activity, and smoking and tobacco use  We talked about the benefits of dietary changes increased physical activity, and avoiding other untoward behaviors which decrease sexual function  With regard to treatments for erectile dysfunction we talked about oral therapies in the form of phosphodiesterase 5 inhibitors, as well as injection therapies with vaso active medications, and surgical therapies with penile prostheses  Specifically, with regard to oral therapies we talked about the need to avoid nitrate medications, the potential interaction with alpha blockers and other blood pressure medications, and the appropriate timing of oral medications approximately 1 hour prior to intercourse on a relatively empty stomach without heavy fatty food or alcohol intake  We talked about the need to take these medications 6-7 times, appropriately, prior to determining treatment failure      We also discussed the potential risks of facial flushing, gastroesophageal reflux symptoms, changes in vision and vision color, and other potential side effects of these medications  Plan:  As needed phosphodiesterase 5 inhibition                 Relevant Medications    sildenafil (REVATIO) 20 mg tablet                Assessment and plan:       Please see problem oriented charting for the assessment plan of today's urological complaints    Laura Jansen MD      Chief Complaint     Chief Complaint   Patient presents with    Nocturia    Erectile dysfunction and concerns  Premature ejaculation    History of Present Illness     Sylvia Loaiza is a 41 y o  gentleman referred in consultation by Dr Becca Li for the above urologic complaints  A copy of today's consultation is being sent to the referring provider in the name of continuity of care  With regard to his nocturia symptoms going on for approximately 4 months, he awakens every 2 hours to void, and sometimes has difficulties going back to sleep  He has limited is evening fluid intake, he denies regular exercise, and I counseled him on good sleeping hygiene  He is pending a sleep study and this is certainly reasonable given the potential for occult sleep apnea contributing to nocturnal over production of urine  He identifies no aggravating or alleviating factors and no other associated symptoms with regard to this complaint  It is affecting his quality of life negatively  With regard to his fluid intake he does take 2 cups of coffee in the morning as well as some tea throughout the day  He does smoke, for 20 years, [place], menthol cigarettes  With regard his premature ejaculation he states that for quite some time he has ejaculated between 1 5-2 minutes after intromission and he has tried pulling out and the squeeze technique previously without much success  This also occurs when he masturbates    He has had 20 lifetime sexual partners, all female  He states that previously he used to take pain killers to increase his ejaculatory latency but he is in recovery at this time  He does not have an interesting using numbing creams at this time but is interested in using as needed, off-label SSRI therapy for premature ejaculation  At the end of today's visit he did inquire as to some Viagra for erectile troubles and I counseled him on this and taken just medication  On review of systems today he denies dysuria, incontinence, hesitancy of urination, gross hematuria, urgency, nocturia is present as listed above, he feels empty after voiding and stream quality is good  Postvoid residual urine volume is 54 milliliters indicating no evidence of overflow incontinence    The following portions of the patient's history were reviewed and updated as appropriate: allergies, current medications, past family history, past medical history, past social history, past surgical history and problem list         Detailed Urologic History     - please refer to HPI    Review of Systems     Review of Systems   Constitutional: Negative  HENT: Negative  Eyes: Negative  Respiratory: Negative  Cardiovascular: Negative  Gastrointestinal: Negative  Endocrine: Negative  Genitourinary:        As per HPI   Musculoskeletal: Negative  Skin: Negative  Allergic/Immunologic: Negative  Neurological: Negative  Hematological: Negative  Psychiatric/Behavioral: Negative  Allergies     No Known Allergies    Physical Exam     Physical Exam   Constitutional: He is oriented to person, place, and time  He appears well-developed and well-nourished  No distress  HENT:   Head: Normocephalic and atraumatic  Right Ear: External ear normal    Left Ear: External ear normal    Nose: Nose normal    Eyes: Pupils are equal, round, and reactive to light  Conjunctivae and EOM are normal  Right eye exhibits no discharge  Left eye exhibits no discharge  No scleral icterus  Neck: Normal range of motion  Neck supple  Cardiovascular: Regular rhythm and intact distal pulses  Pulmonary/Chest: Effort normal  No stridor  No respiratory distress  He has no wheezes  Abdominal: Soft  He exhibits no distension and no mass  There is no tenderness  There is no rebound and no guarding  No hernia  Genitourinary: Rectum normal, prostate normal and penis normal    Genitourinary Comments: Prostate is 20 grams, smooth, no nodules   Musculoskeletal: He exhibits no edema, tenderness or deformity  Neurological: He is alert and oriented to person, place, and time  No cranial nerve deficit or sensory deficit  He exhibits normal muscle tone  Coordination normal    Skin: Skin is warm and dry  No rash noted  He is not diaphoretic  No erythema  No pallor  Psychiatric: He has a normal mood and affect  His behavior is normal  Judgment and thought content normal    Nursing note and vitals reviewed            Vital Signs  Vitals:    01/31/19 0836   BP: 140/84   BP Location: Left arm   Patient Position: Sitting   Cuff Size: Standard   Pulse: 97   Weight: 86 2 kg (190 lb)   Height: 5' 9 5" (1 765 m)         Current Medications       Current Outpatient Prescriptions:     acetaminophen (TYLENOL) 500 mg tablet, Take 500 mg by mouth every 6 (six) hours as needed, Disp: , Rfl:     nicotine (NICODERM CQ) 14 mg/24hr TD 24 hr patch, Place 1 patch on the skin every 24 hours, Disp: , Rfl:     nicotine (NICODERM CQ) 21 mg/24 hr TD 24 hr patch, Place 1 patch on the skin every 24 hours, Disp: , Rfl:     PARoxetine (PAXIL) 40 MG tablet, Take 1 tablet (40 mg total) by mouth see administration instructions Take daily as needed, Disp: 30 tablet, Rfl: 3    sildenafil (REVATIO) 20 mg tablet, Take 2-5 tablets as needed daily for intercourse, Disp: 60 tablet, Rfl: 6      Active Problems     Patient Active Problem List   Diagnosis    Premature ejaculation    Nocturia    Erectile dysfunction due to arterial insufficiency         Past Medical History     Past Medical History:   Diagnosis Date    Substance abuse [facility])          Surgical History     Past Surgical History:   Procedure Laterality Date    WISDOM TOOTH EXTRACTION           Family History     Family History   Problem Relation Age of Onset    Cancer Maternal Grandmother          Social History     Social History     History   Smoking Status    Current Every Day Smoker    Packs/day: 1 00    Types: Cigarettes    Start date: 1/1/2000   Smokeless Tobacco    Never Used         Pertinent Lab Values     Lab Results   Component Value Date    CREATININE 1 40 (H) 01/21/2019       No results found for: PSA          Pertinent Imaging      There is no pertinent urological imaging for my review

## 2019-01-31 NOTE — ASSESSMENT & PLAN NOTE
Patient has previously used over-the-counter remedies to help with his erectile function  Today we discussed the normal physiology of erection and ejaculation and the complex nature of human sexuality and arousal     We talked about behavioral contribution to poor sexual function including the intake of a poor diet, poor physical activity, and smoking and tobacco use  We talked about the benefits of dietary changes increased physical activity, and avoiding other untoward behaviors which decrease sexual function  With regard to treatments for erectile dysfunction we talked about oral therapies in the form of phosphodiesterase 5 inhibitors, as well as injection therapies with vaso active medications, and surgical therapies with penile prostheses  Specifically, with regard to oral therapies we talked about the need to avoid nitrate medications, the potential interaction with alpha blockers and other blood pressure medications, and the appropriate timing of oral medications approximately 1 hour prior to intercourse on a relatively empty stomach without heavy fatty food or alcohol intake  We talked about the need to take these medications 6-7 times, appropriately, prior to determining treatment failure  We also discussed the potential risks of facial flushing, gastroesophageal reflux symptoms, changes in vision and vision color, and other potential side effects of these medications      Plan:  As needed phosphodiesterase 5 inhibition

## 2020-07-02 ENCOUNTER — OFFICE VISIT (OUTPATIENT)
Dept: INTERNAL MEDICINE CLINIC | Facility: CLINIC | Age: 40
End: 2020-07-02
Payer: COMMERCIAL

## 2020-07-02 VITALS
DIASTOLIC BLOOD PRESSURE: 74 MMHG | SYSTOLIC BLOOD PRESSURE: 120 MMHG | HEART RATE: 70 BPM | BODY MASS INDEX: 25.36 KG/M2 | WEIGHT: 174.2 LBS | RESPIRATION RATE: 18 BRPM | TEMPERATURE: 98.2 F

## 2020-07-02 DIAGNOSIS — R19.7 DIARRHEA, UNSPECIFIED TYPE: Primary | ICD-10-CM

## 2020-07-02 PROCEDURE — 4004F PT TOBACCO SCREEN RCVD TLK: CPT | Performed by: PHYSICIAN ASSISTANT

## 2020-07-02 PROCEDURE — 99213 OFFICE O/P EST LOW 20 MIN: CPT | Performed by: PHYSICIAN ASSISTANT

## 2020-07-02 NOTE — PROGRESS NOTES
Assessment/Plan:  He was given a note to go back to work GI symptoms have resolved physical exam unremarkable       Diagnoses and all orders for this visit:    Diarrhea, unspecified type        No problem-specific Assessment & Plan notes found for this encounter  BMI Counseling: BMI was not able to be calculated due to patient refusing height and/or weight  Depression Screening and Follow-up Plan: Patient not screened for depression due to patient refusal           Subjective:      Patient ID: Analisa Mcrae is a 46 y o  male  He had diarrhea some abdominal cramping for the past 3 days completely resolved as of last night currently feels well completely asymptomatic eating and drinking without any problem no fever chills shortness of breath or coughing  He is trying to quit smoking      The following portions of the patient's history were reviewed and updated as appropriate:   He has a past medical history of Substance abuse ([facility] [address] )  ,  does not have any pertinent problems on file  ,   has a past surgical history that includes [place] tooth extraction  ,  family history includes Cancer in his maternal grandmother  ,   reports that he has been smoking cigarettes  He started smoking about 20 years ago  He has been smoking about 1 00 pack per day  He has never used smokeless tobacco  He reports that he does not drink alcohol or use drugs  ,  has No Known Allergies     Current Outpatient Medications   Medication Sig Dispense Refill    nicotine (NICODERM CQ) 14 mg/24hr TD 24 hr patch Place 1 patch on the skin every 24 hours      nicotine (NICODERM CQ) 21 mg/24 hr TD 24 hr patch Place 1 patch on the skin every 24 hours      acetaminophen (TYLENOL) 500 mg tablet Take 500 mg by mouth every 6 (six) hours as needed       No current facility-administered medications for this visit          Review of Systems   Constitutional: Negative for activity change, appetite change, chills, diaphoresis, fatigue, fever and unexpected weight change  HENT: Negative for congestion, dental problem, drooling, ear discharge, ear pain, facial swelling, hearing loss, nosebleeds, postnasal drip, rhinorrhea, sinus pressure, sinus pain, sneezing, sore throat, tinnitus, trouble swallowing and voice change  Eyes: Negative for photophobia, pain, discharge, redness, itching and visual disturbance  Respiratory: Negative for apnea, cough, choking, chest tightness, shortness of breath, wheezing and stridor  Cardiovascular: Negative for chest pain, palpitations and leg swelling  Gastrointestinal: Negative for abdominal distention, abdominal pain, anal bleeding, blood in stool, constipation, diarrhea, nausea, rectal pain and vomiting  Endocrine: Negative for cold intolerance, heat intolerance, polydipsia, polyphagia and polyuria  Genitourinary: Negative for decreased urine volume, difficulty urinating, dysuria, enuresis, flank pain, frequency, genital sores, hematuria and urgency  Musculoskeletal: Negative for arthralgias, back pain, gait problem, joint swelling, myalgias, neck pain and neck stiffness  Skin: Negative for color change, pallor, rash and wound  Neurological: Negative for dizziness, tremors, seizures, syncope, facial asymmetry, speech difficulty, weakness, light-headedness, numbness and headaches  Hematological: Negative for adenopathy  Does not bruise/bleed easily  Psychiatric/Behavioral: Negative for agitation, behavioral problems, confusion, decreased concentration, dysphoric mood, hallucinations, self-injury, sleep disturbance and suicidal ideas  The patient is not nervous/anxious and is not hyperactive  Objective:  Vitals:    07/02/20 1600   BP: 120/74   Pulse: 70   Resp: 18   Temp: 98 2 °F (36 8 °C)   Weight: 79 kg (174 lb 3 2 oz)     Body mass index is 25 36 kg/m²  Physical Exam   Constitutional: He is oriented to person, place, and time  He appears well-developed  HENT:   Head: Normocephalic  Right Ear: External ear normal    Left Ear: External ear normal    Mouth/Throat: Oropharynx is clear and moist    Eyes: Pupils are equal, round, and reactive to light  Conjunctivae are normal    Neck: Neck supple  No thyromegaly present  Cardiovascular: Normal rate, regular rhythm, normal heart sounds and intact distal pulses  Pulmonary/Chest: Effort normal and breath sounds normal    Abdominal: Soft  Bowel sounds are normal    Musculoskeletal: Normal range of motion  Neurological: He is alert and oriented to person, place, and time  He has normal reflexes  Skin: Skin is warm and dry

## 2020-07-02 NOTE — LETTER
July 2, 2020     Patient: Kevin Munoz   YOB: 1980   Date of Visit: 7/2/2020       To Whom it May Concern:    Kevin Munoz is under my professional care  He was seen in my office on 7/2/2020  He may return to work on 7/3/20  If you have any questions or concerns, please don't hesitate to call           Sincerely,          Rakesh Arriaga PA-C        CC: Kevin Munoz

## 2021-02-10 DIAGNOSIS — F52.4 PREMATURE EJACULATION: Primary | ICD-10-CM

## 2021-02-10 DIAGNOSIS — N52.01 ERECTILE DYSFUNCTION DUE TO ARTERIAL INSUFFICIENCY: ICD-10-CM

## 2021-02-10 NOTE — TELEPHONE ENCOUNTER
Medication Refill Request patient next appt for 6/2 with Adrienne     Patient calling to request a refill of PARoxetine (PAXIL) 40 MG tablet and  sildenafil (REVATIO) 20 mg tablet to be sent to : [facility]/pharmacy   [address] [place] [id number]    Phone:  [phone]   patient can be reached at [phone]

## 2021-02-11 DIAGNOSIS — N52.01 ERECTILE DYSFUNCTION DUE TO ARTERIAL INSUFFICIENCY: ICD-10-CM

## 2021-02-11 RX ORDER — PAROXETINE HYDROCHLORIDE 40 MG/1
TABLET, FILM COATED ORAL
Qty: 30 TABLET | Refills: 1 | Status: SHIPPED | OUTPATIENT
Start: 2021-02-11 | End: 2021-08-10 | Stop reason: SDUPTHER

## 2021-02-11 RX ORDER — SILDENAFIL CITRATE 20 MG/1
TABLET ORAL
Qty: 90 TABLET | Refills: 1 | Status: SHIPPED | OUTPATIENT
Start: 2021-02-11 | End: 2021-02-12

## 2021-02-11 NOTE — TELEPHONE ENCOUNTER
The patient has an upcoming office visit scheduled for 6/2/21 with Marcela Bender PA-C in the [facility] location but will run out of medication until then  The pharmacy the patient requested is CLOSED and service is now being provided at the nearby [facility]/pharmacy at [address] [place] [address] in [place], [place]    Requests for both medications were queued and forwarded to the Advanced Practitioner covering the [facility] location for approval

## 2021-02-12 RX ORDER — SILDENAFIL CITRATE 20 MG/1
TABLET ORAL
Qty: 90 TABLET | Refills: 1 | Status: SHIPPED | OUTPATIENT
Start: 2021-02-12 | End: 2021-03-02 | Stop reason: CLARIF

## 2021-02-25 NOTE — TELEPHONE ENCOUNTER
Patient left a message on the Medication Refill voice mail line stating the office needs to approve the refill

## 2021-02-26 RX ORDER — SILDENAFIL 50 MG/1
TABLET, FILM COATED ORAL
Qty: 30 TABLET | Refills: 3 | Status: SHIPPED | OUTPATIENT
Start: 2021-02-26 | End: 2021-08-10 | Stop reason: SDUPTHER

## 2021-02-26 NOTE — TELEPHONE ENCOUNTER
I reached out to the patient to clarify his request   He states his insurance isn't covering it and it requires prior authorization  I explained to the patient that performance enhancing medications are NOT covered by Medical Assistance  He confirmed that he uses 50 to 60mg worth of drug per encounter and would prefer that dosage moving forward  We discussed cost-effective pricing and various pharmacy vendors  Margarito recommended for best cash pay pricing  Cost of #30 count supply of Sildenafil 50mg to [facility] is $13 46  GoodRx coupon was included on the prescription  The patient has an upcoming office visit scheduled for 6/2/21 with Jake Portillo PA-C in the [facility] location but will run out of medication until then    Request for same, 30 tablets supply with 3 refills was queued and forwarded to the Advanced Practitioner covering the [facility] location for approval

## 2021-08-10 ENCOUNTER — APPOINTMENT (OUTPATIENT)
Dept: LAB | Facility: CLINIC | Age: 41
End: 2021-08-10
Payer: COMMERCIAL

## 2021-08-10 ENCOUNTER — OFFICE VISIT (OUTPATIENT)
Dept: INTERNAL MEDICINE CLINIC | Facility: CLINIC | Age: 41
End: 2021-08-10
Payer: COMMERCIAL

## 2021-08-10 VITALS
DIASTOLIC BLOOD PRESSURE: 78 MMHG | OXYGEN SATURATION: 94 % | BODY MASS INDEX: 25.77 KG/M2 | WEIGHT: 180 LBS | SYSTOLIC BLOOD PRESSURE: 120 MMHG | HEIGHT: 70 IN | TEMPERATURE: 98.4 F | HEART RATE: 45 BPM

## 2021-08-10 DIAGNOSIS — Z00.00 ADULT GENERAL MEDICAL EXAMINATION: Primary | ICD-10-CM

## 2021-08-10 DIAGNOSIS — Z11.59 NEED FOR HEPATITIS C SCREENING TEST: ICD-10-CM

## 2021-08-10 DIAGNOSIS — Z11.4 SCREENING FOR HIV (HUMAN IMMUNODEFICIENCY VIRUS): ICD-10-CM

## 2021-08-10 DIAGNOSIS — Z13.6 SCREENING FOR CARDIOVASCULAR CONDITION: ICD-10-CM

## 2021-08-10 DIAGNOSIS — Z00.00 ADULT GENERAL MEDICAL EXAMINATION: ICD-10-CM

## 2021-08-10 DIAGNOSIS — F52.4 PREMATURE EJACULATION: ICD-10-CM

## 2021-08-10 DIAGNOSIS — N52.01 ERECTILE DYSFUNCTION DUE TO ARTERIAL INSUFFICIENCY: ICD-10-CM

## 2021-08-10 LAB
ANION GAP SERPL CALCULATED.3IONS-SCNC: 5 MMOL/L (ref 4–13)
BUN SERPL-MCNC: 27 MG/DL (ref 5–25)
CALCIUM SERPL-MCNC: 9.8 MG/DL (ref 8.3–10.1)
CHLORIDE SERPL-SCNC: 103 MMOL/L (ref 100–108)
CHOLEST SERPL-MCNC: 217 MG/DL (ref 50–200)
CO2 SERPL-SCNC: 31 MMOL/L (ref 21–32)
CREAT SERPL-MCNC: 1.16 MG/DL (ref 0.6–1.3)
ERYTHROCYTE [DISTWIDTH] IN BLOOD BY AUTOMATED COUNT: 12 % (ref 11.6–15.1)
GFR SERPL CREATININE-BSD FRML MDRD: 79 ML/MIN/1.73SQ M
GLUCOSE P FAST SERPL-MCNC: 82 MG/DL (ref 65–99)
HCT VFR BLD AUTO: 45.2 % (ref 36.5–49.3)
HCV AB SER QL: NORMAL
HDLC SERPL-MCNC: 104 MG/DL
HGB BLD-MCNC: 14.8 G/DL (ref 12–17)
LDLC SERPL CALC-MCNC: 98 MG/DL (ref 0–100)
MCH RBC QN AUTO: 31.5 PG (ref 26.8–34.3)
MCHC RBC AUTO-ENTMCNC: 32.7 G/DL (ref 31.4–37.4)
MCV RBC AUTO: 96 FL (ref 82–98)
NONHDLC SERPL-MCNC: 113 MG/DL
PLATELET # BLD AUTO: 245 THOUSANDS/UL (ref 149–390)
PMV BLD AUTO: 10 FL (ref 8.9–12.7)
POTASSIUM SERPL-SCNC: 4.9 MMOL/L (ref 3.5–5.3)
RBC # BLD AUTO: 4.7 MILLION/UL (ref 3.88–5.62)
SODIUM SERPL-SCNC: 139 MMOL/L (ref 136–145)
TRIGL SERPL-MCNC: 75 MG/DL
WBC # BLD AUTO: 8.71 THOUSAND/UL (ref 4.31–10.16)

## 2021-08-10 PROCEDURE — 80048 BASIC METABOLIC PNL TOTAL CA: CPT

## 2021-08-10 PROCEDURE — 87389 HIV-1 AG W/HIV-1&-2 AB AG IA: CPT

## 2021-08-10 PROCEDURE — 4004F PT TOBACCO SCREEN RCVD TLK: CPT | Performed by: INTERNAL MEDICINE

## 2021-08-10 PROCEDURE — 3008F BODY MASS INDEX DOCD: CPT | Performed by: INTERNAL MEDICINE

## 2021-08-10 PROCEDURE — 3725F SCREEN DEPRESSION PERFORMED: CPT | Performed by: INTERNAL MEDICINE

## 2021-08-10 PROCEDURE — 36415 COLL VENOUS BLD VENIPUNCTURE: CPT

## 2021-08-10 PROCEDURE — 80061 LIPID PANEL: CPT

## 2021-08-10 PROCEDURE — 85027 COMPLETE CBC AUTOMATED: CPT

## 2021-08-10 PROCEDURE — 86803 HEPATITIS C AB TEST: CPT

## 2021-08-10 PROCEDURE — 99395 PREV VISIT EST AGE 18-39: CPT | Performed by: INTERNAL MEDICINE

## 2021-08-10 RX ORDER — PAROXETINE HYDROCHLORIDE 40 MG/1
TABLET, FILM COATED ORAL
Qty: 30 TABLET | Refills: 3 | Status: SHIPPED | OUTPATIENT
Start: 2021-08-10

## 2021-08-10 RX ORDER — SILDENAFIL 50 MG/1
TABLET, FILM COATED ORAL
Qty: 30 TABLET | Refills: 3 | Status: SHIPPED | OUTPATIENT
Start: 2021-08-10

## 2021-08-10 NOTE — PROGRESS NOTES
ADULT ANNUAL PHYSICAL   [address]    NAME: Raúl Orr  AGE: 45 y o  SEX: male  : 1980     DATE: 8/10/2021     Assessment and Plan:     Problem List Items Addressed This Visit        Cardiovascular and Mediastinum    Erectile dysfunction due to arterial insufficiency    Relevant Medications    sildenafil (VIAGRA) 50 MG tablet       Other    Premature ejaculation    Relevant Medications    PARoxetine (PAXIL) 40 MG tablet      Other Visit Diagnoses     Adult general medical examination    -  Primary    Relevant Orders    CBC    Basic metabolic panel    Screening for cardiovascular condition        Relevant Orders    Lipid panel    Screening for HIV (human immunodeficiency virus)        Relevant Orders    HIV 1/2 Antigen/Antibody (4th Generation) w Reflex [facility]    Need for hepatitis C screening test        Relevant Orders    Hepatitis C antibody        Discussed COVID-19 vaccine with him  He is open to get vaccinated and he can set up an appt with [facility]  Will check some basic labs  Medication refills sent to pharmacy  Immunizations and preventive care screenings were discussed with patient today  Appropriate education was printed on patient's after visit summary  Counseling:  Alcohol/drug use: discussed moderation in alcohol intake, the recommendations for healthy alcohol use, and avoidance of illicit drug use  Dental Health: discussed importance of regular tooth brushing, flossing, and dental visits  Injury prevention: discussed safety/seat belts, safety helmets, smoke detectors, carbon dioxide detectors, and smoking near bedding or upholstery  · Sexual health: discussed sexually transmitted diseases, partner selection, use of condoms, avoidance of unintended pregnancy, and contraceptive alternatives  BMI Counseling: Body mass index is 26 2 kg/m²   The BMI is above normal  Nutrition recommendations include decreasing portion sizes, encouraging healthy choices of fruits and vegetables, limiting drinks that contain sugar, moderation in carbohydrate intake and increasing intake of lean protein  Exercise recommendations include exercising 3-5 times per week  Return in about 1 year (around 8/11/2022) for Follow-up  Chief Complaint:     Chief Complaint   Patient presents with    Follow-up      History of Present Illness:     Adult Annual Physical   Patient here for a comprehensive physical exam  The patient reports no problems  I haven't seen him since 2019  Due for lab work  Has been able to work during COVID-23  Needs some medication refills  Has known history of ED/premature ejaculation  Has been evaluated by Urology in past  He hasn't got COVID-19 vaccine yet but he is open to it  Diet and Physical Activity  · Diet/Nutrition: well balanced diet  Depression Screening  PHQ-9 Depression Screening    PHQ-9:   Frequency of the following problems over the past two weeks:      Little interest or pleasure in doing things: 0 - not at all  Feeling down, depressed, or hopeless: 0 - not at all  PHQ-2 Score: 0       General Health  · Sleep: sleeps well  · Hearing: normal - bilateral   · Vision: no vision problems  · Dental: no dental visits for >1 year and brushes teeth once daily  Review of Systems:     Review of Systems   Constitutional: Negative for appetite change, chills, fatigue and fever  HENT: Negative for congestion, hearing loss, postnasal drip, rhinorrhea, sore throat, tinnitus and trouble swallowing  Eyes: Negative for pain, discharge, redness and visual disturbance  Respiratory: Negative for cough, chest tightness, shortness of breath and wheezing  Cardiovascular: Negative for chest pain, palpitations and leg swelling  Gastrointestinal: Negative for abdominal distention, abdominal pain, blood in stool, constipation, diarrhea, nausea and vomiting     Endocrine: Negative for cold intolerance, heat intolerance, polydipsia, polyphagia and polyuria  Genitourinary: Negative for difficulty urinating, dysuria, frequency, hematuria and urgency  Musculoskeletal: Negative for arthralgias, back pain, gait problem, joint swelling, myalgias, neck pain and neck stiffness  Skin: Negative for color change and rash  Neurological: Negative for dizziness, tremors, seizures, syncope, speech difficulty, weakness, light-headedness, numbness and headaches  Hematological: Negative for adenopathy  Does not bruise/bleed easily  Psychiatric/Behavioral: Positive for behavioral problems (difficulty with focus)  Negative for agitation, confusion, hallucinations, sleep disturbance and suicidal ideas  The patient is not nervous/anxious  Past Medical History:     Past Medical History:   Diagnosis Date    Substance abuse ([facility] [address] )       Past Surgical History:     Past Surgical History:   Procedure Laterality Date    WISDOM TOOTH EXTRACTION        Social History:     Social History     Socioeconomic History    Marital status: Single     Spouse name: None    Number of children: None    Years of education: None    Highest education level: None   Occupational History    None   Tobacco Use    Smoking status: Current Every Day Smoker     Packs/day: 1 00     Types: Cigarettes     Start date: 1/1/2000    Smokeless tobacco: Never Used   Vaping Use    Vaping Use: Every day    Substances: Nicotine   Substance and Sexual Activity    Alcohol use: No    Drug use: No    Sexual activity: Yes     Partners: Female   Other Topics Concern    None   Social History Narrative    None     Social Determinants of Health     Financial Resource Strain:     Difficulty of Paying Living Expenses:    Food Insecurity:     Worried About Running Out of Food in the Last Year:     Ran Out of Food in the Last Year:    Transportation Needs:     Lack of Transportation (Medical):      Lack of Transportation (Non-Medical): Physical Activity:     Days of Exercise per Week:     Minutes of Exercise per Session:    Stress:     Feeling of Stress :    Social Connections:     Frequency of Communication with Friends and Family:     Frequency of Social Gatherings with Friends and Family:     Attends Jewish Services:     Active Member of Clubs or Organizations:     Attends Club or Organization Meetings:     Marital Status:    Intimate Partner Violence:     Fear of Current or Ex-Partner:     Emotionally Abused:     Physically Abused:     Sexually Abused:       Family History:     Family History   Problem Relation Age of Onset    Cancer Maternal Grandmother       Current Medications:     Current Outpatient Medications   Medication Sig Dispense Refill    PARoxetine (PAXIL) 40 MG tablet Take 1 tablet by mouth PRN prior to intercourse  30 tablet 3    sildenafil (VIAGRA) 50 MG tablet Take 1 to 2 tablets by mouth one (1) hour prior to intercourse on an empty stomach  30 tablet 3     No current facility-administered medications for this visit  Allergies:     No Known Allergies   Physical Exam:     /78   Pulse (!) 45   Temp 98 4 °F (36 9 °C)   Ht 5' 9 5" (1 765 m)   Wt 81 6 kg (180 lb)   SpO2 94%   BMI 26 20 kg/m²     Physical Exam  Vitals reviewed  Constitutional:       General: He is not in acute distress  Appearance: He is well-developed  He is not diaphoretic  HENT:      Right Ear: Tympanic membrane, ear canal and external ear normal  There is no impacted cerumen  Left Ear: Tympanic membrane, ear canal and external ear normal  There is no impacted cerumen  Eyes:      General: No scleral icterus  Right eye: No discharge  Left eye: No discharge  Conjunctiva/sclera: Conjunctivae normal    Neck:      Thyroid: No thyromegaly  Vascular: No JVD  Cardiovascular:      Rate and Rhythm: Normal rate and regular rhythm  Heart sounds: Normal heart sounds  No murmur heard  Pulmonary:      Effort: Pulmonary effort is normal  No respiratory distress  Breath sounds: Normal breath sounds  No wheezing or rales  Abdominal:      General: Bowel sounds are normal  There is no distension  Palpations: Abdomen is soft  Tenderness: There is no abdominal tenderness  Musculoskeletal:      Cervical back: Neck supple  Right lower leg: No edema  Left lower leg: No edema  Lymphadenopathy:      Cervical: No cervical adenopathy  Skin:     General: Skin is warm and dry  Neurological:      Mental Status: He is alert     Psychiatric:         Mood and Affect: Mood normal          Behavior: Behavior normal           Hailey Oreilly DO   MEDICAL [id number]

## 2021-08-10 NOTE — PATIENT INSTRUCTIONS

## 2021-08-11 ENCOUNTER — TELEPHONE (OUTPATIENT)
Dept: INTERNAL MEDICINE CLINIC | Facility: CLINIC | Age: 41
End: 2021-08-11

## 2021-08-11 LAB — HIV 1+2 AB+HIV1 P24 AG SERPL QL IA: NORMAL

## 2021-08-11 NOTE — TELEPHONE ENCOUNTER
----- Message from Carlito Zamudio DO sent at 8/11/2021  1:28 PM EDT -----  Labs look good   No significant abnormalities

## 2021-08-20 ENCOUNTER — IMMUNIZATIONS (OUTPATIENT)
Dept: FAMILY MEDICINE CLINIC | Facility: HOSPITAL | Age: 41
End: 2021-08-20

## 2021-08-20 DIAGNOSIS — Z23 ENCOUNTER FOR IMMUNIZATION: Primary | ICD-10-CM

## 2021-08-20 PROCEDURE — 0001A SARS-COV-2 / COVID-19 MRNA VACCINE (PFIZER-BIONTECH) 30 MCG: CPT

## 2021-08-20 PROCEDURE — 91300 SARS-COV-2 / COVID-19 MRNA VACCINE (PFIZER-BIONTECH) 30 MCG: CPT

## 2021-09-17 ENCOUNTER — IMMUNIZATIONS (OUTPATIENT)
Dept: FAMILY MEDICINE CLINIC | Facility: HOSPITAL | Age: 41
End: 2021-09-17

## 2021-09-17 DIAGNOSIS — Z23 ENCOUNTER FOR IMMUNIZATION: Primary | ICD-10-CM

## 2021-09-17 PROCEDURE — 0002A SARS-COV-2 / COVID-19 MRNA VACCINE (PFIZER-BIONTECH) 30 MCG: CPT

## 2021-09-17 PROCEDURE — 91300 SARS-COV-2 / COVID-19 MRNA VACCINE (PFIZER-BIONTECH) 30 MCG: CPT

## 2022-11-16 ENCOUNTER — OFFICE VISIT (OUTPATIENT)
Dept: INTERNAL MEDICINE CLINIC | Facility: CLINIC | Age: 42
End: 2022-11-16

## 2022-11-16 ENCOUNTER — APPOINTMENT (OUTPATIENT)
Dept: LAB | Facility: CLINIC | Age: 42
End: 2022-11-16

## 2022-11-16 VITALS
SYSTOLIC BLOOD PRESSURE: 98 MMHG | DIASTOLIC BLOOD PRESSURE: 58 MMHG | HEART RATE: 79 BPM | OXYGEN SATURATION: 95 % | WEIGHT: 192.8 LBS | BODY MASS INDEX: 27.6 KG/M2 | HEIGHT: 70 IN | TEMPERATURE: 99.7 F

## 2022-11-16 DIAGNOSIS — K30 INDIGESTION: Primary | ICD-10-CM

## 2022-11-16 DIAGNOSIS — K30 INDIGESTION: ICD-10-CM

## 2022-11-16 LAB
ALBUMIN SERPL BCP-MCNC: 3.8 G/DL (ref 3.5–5)
ALP SERPL-CCNC: 76 U/L (ref 46–116)
ALT SERPL W P-5'-P-CCNC: 37 U/L (ref 12–78)
ANION GAP SERPL CALCULATED.3IONS-SCNC: 1 MMOL/L (ref 4–13)
AST SERPL W P-5'-P-CCNC: 30 U/L (ref 5–45)
BASOPHILS # BLD AUTO: 0.08 THOUSANDS/ÂΜL (ref 0–0.1)
BASOPHILS NFR BLD AUTO: 1 % (ref 0–1)
BILIRUB SERPL-MCNC: 0.32 MG/DL (ref 0.2–1)
BUN SERPL-MCNC: 28 MG/DL (ref 5–25)
CALCIUM SERPL-MCNC: 9.2 MG/DL (ref 8.3–10.1)
CHLORIDE SERPL-SCNC: 107 MMOL/L (ref 96–108)
CHOLEST SERPL-MCNC: 155 MG/DL
CO2 SERPL-SCNC: 30 MMOL/L (ref 21–32)
CREAT SERPL-MCNC: 1.3 MG/DL (ref 0.6–1.3)
EOSINOPHIL # BLD AUTO: 0.42 THOUSAND/ÂΜL (ref 0–0.61)
EOSINOPHIL NFR BLD AUTO: 6 % (ref 0–6)
ERYTHROCYTE [DISTWIDTH] IN BLOOD BY AUTOMATED COUNT: 12.2 % (ref 11.6–15.1)
GFR SERPL CREATININE-BSD FRML MDRD: 67 ML/MIN/1.73SQ M
GLUCOSE P FAST SERPL-MCNC: 79 MG/DL (ref 65–99)
HCT VFR BLD AUTO: 42.5 % (ref 36.5–49.3)
HDLC SERPL-MCNC: 75 MG/DL
HGB BLD-MCNC: 13.7 G/DL (ref 12–17)
IMM GRANULOCYTES # BLD AUTO: 0.02 THOUSAND/UL (ref 0–0.2)
IMM GRANULOCYTES NFR BLD AUTO: 0 % (ref 0–2)
LDLC SERPL CALC-MCNC: 65 MG/DL (ref 0–100)
LYMPHOCYTES # BLD AUTO: 2.07 THOUSANDS/ÂΜL (ref 0.6–4.47)
LYMPHOCYTES NFR BLD AUTO: 29 % (ref 14–44)
MCH RBC QN AUTO: 31.1 PG (ref 26.8–34.3)
MCHC RBC AUTO-ENTMCNC: 32.2 G/DL (ref 31.4–37.4)
MCV RBC AUTO: 97 FL (ref 82–98)
MONOCYTES # BLD AUTO: 0.52 THOUSAND/ÂΜL (ref 0.17–1.22)
MONOCYTES NFR BLD AUTO: 7 % (ref 4–12)
NEUTROPHILS # BLD AUTO: 4.06 THOUSANDS/ÂΜL (ref 1.85–7.62)
NEUTS SEG NFR BLD AUTO: 57 % (ref 43–75)
NRBC BLD AUTO-RTO: 0 /100 WBCS
PLATELET # BLD AUTO: 287 THOUSANDS/UL (ref 149–390)
PMV BLD AUTO: 10 FL (ref 8.9–12.7)
POTASSIUM SERPL-SCNC: 4.6 MMOL/L (ref 3.5–5.3)
PROT SERPL-MCNC: 6.6 G/DL (ref 6.4–8.4)
RBC # BLD AUTO: 4.4 MILLION/UL (ref 3.88–5.62)
SODIUM SERPL-SCNC: 138 MMOL/L (ref 135–147)
TRIGL SERPL-MCNC: 73 MG/DL
WBC # BLD AUTO: 7.17 THOUSAND/UL (ref 4.31–10.16)

## 2022-11-16 RX ORDER — DEXTROAMPHETAMINE SACCHARATE, AMPHETAMINE ASPARTATE MONOHYDRATE, DEXTROAMPHETAMINE SULFATE AND AMPHETAMINE SULFATE 6.25; 6.25; 6.25; 6.25 MG/1; MG/1; MG/1; MG/1
25 CAPSULE, EXTENDED RELEASE ORAL EVERY MORNING
COMMUNITY
Start: 2022-11-12

## 2022-11-16 RX ORDER — BUPRENORPHINE AND NALOXONE 2; .5 MG/1; MG/1
FILM, SOLUBLE BUCCAL; SUBLINGUAL
COMMUNITY
Start: 2022-11-11

## 2022-11-16 RX ORDER — OMEPRAZOLE 20 MG/1
20 CAPSULE, DELAYED RELEASE ORAL DAILY
Qty: 30 CAPSULE | Refills: 3 | Status: SHIPPED | OUTPATIENT
Start: 2022-11-16

## 2022-11-16 RX ORDER — DEXTROAMPHETAMINE SACCHARATE, AMPHETAMINE ASPARTATE, DEXTROAMPHETAMINE SULFATE AND AMPHETAMINE SULFATE 2.5; 2.5; 2.5; 2.5 MG/1; MG/1; MG/1; MG/1
TABLET ORAL
COMMUNITY
Start: 2022-11-11

## 2022-11-16 NOTE — PROGRESS NOTES
INTERNAL MEDICINE FOLLOW-UP VISIT  [facility] [address] [place] [address]    NAME: Lisbeth Balderas  AGE: 40 y o  SEX: male  : 1980     DATE: 2022     Assessment and Plan:   1  Indigestion  Occurs after eating only, resolves with TUMS  Limit spicy foods  Limit eating late at night  Limit caffeine, chocolate, and ibuprofen use  Will start trial of Omeprazole 20 mg daily  Discussed frequency, dosing, side effects, and mechanism of action with patient  Two thousand nineteen patient had a 6 1 hemoglobin A1c will do labs to follow up with this  - HEMOGLOBIN A1C W/ EAG ESTIMATION; Future  - CBC and differential; Future  - Comprehensive metabolic panel; Future  - Lipid Panel with Direct LDL reflex; Future  - omeprazole (PriLOSEC) 20 mg delayed release capsule; Take 1 capsule (20 mg total) by mouth daily  Dispense: 30 capsule; Refill: 3        No follow-ups on file  Chief Complaint:     Chief Complaint   Patient presents with   • Heartburn     Indigestion started about 3 weeks ago and the patient stated he took Tums but it only helped a little bit  History of Present Illness:     Patient is here today with ongoing complaints of “indigestion”  He states that this occurs mainly after eating and it is random with no trending of food intake  Patient denies any nausea, vomiting, diarrhea  He states that the symptoms of indigestion are resolved with use of Tums  He has not tried any other medications at this time  He denies shortness of breath, chest pain, shoulder or jaw pain  The following portions of the patient's history were reviewed and updated as appropriate: allergies, current medications, past family history, past medical history, past social history, past surgical history and problem list      Review of Systems:     Review of Systems   Constitutional: Negative for appetite change, chills, diaphoresis, fatigue, fever and unexpected weight change     HENT: Negative for postnasal drip and sneezing  Eyes: Negative for visual disturbance  Respiratory: Negative for chest tightness and shortness of breath  Cardiovascular: Negative for chest pain, palpitations and leg swelling  Gastrointestinal: Positive for abdominal pain  Negative for blood in stool  Endocrine: Negative for cold intolerance, heat intolerance, polydipsia, polyphagia and polyuria  Genitourinary: Negative for difficulty urinating, dysuria, frequency and urgency  Musculoskeletal: Negative for arthralgias and myalgias  Skin: Negative for rash and wound  Neurological: Negative for dizziness, weakness, light-headedness and headaches  Hematological: Negative for adenopathy  Psychiatric/Behavioral: Negative for confusion, dysphoric mood and sleep disturbance  The patient is not nervous/anxious           Past Medical History:     Past Medical History:   Diagnosis Date   • [facility]         Current Medications:     Current Outpatient Medications:   •  amphetamine-dextroamphetamine (ADDERALL XR) 25 MG 24 hr capsule, Take 25 mg by mouth every morning, Disp: , Rfl:   •  amphetamine-dextroamphetamine (ADDERALL) 10 mg tablet, take 1 tablet by mouth AT 4PM, Disp: , Rfl:   •  buprenorphine-naloxone (Suboxone) 2-0 5 mg, DISSOLVE 1 AND A HALF FILMS UDNER THE TONGUE DAILY, Disp: , Rfl:   •  omeprazole (PriLOSEC) 20 mg delayed release capsule, Take 1 capsule (20 mg total) by mouth daily, Disp: 30 capsule, Rfl: 3  •  sildenafil (VIAGRA) 50 MG tablet, Take 1 to 2 tablets by mouth one (1) hour prior to intercourse on an empty stomach , Disp: 30 tablet, Rfl: 3     Allergies:   No Known Allergies     Physical Exam:     BP 98/58 (BP Location: Left arm, Patient Position: Sitting, Cuff Size: Standard) Comment: bp  Pulse 79   Temp 99 7 °F (37 6 °C) (Temporal) Comment: no Comment (Src): no  Ht 5' 9 5" (1 765 m)   Wt 87 5 kg (192 lb 12 8 oz)   SpO2 95%   BMI 28 06 kg/m²     Physical Exam  Constitutional:       Appearance: He is well-developed and well-nourished  HENT:      Head: Normocephalic and atraumatic  Eyes:      Conjunctiva/sclera: Conjunctivae normal       Pupils: Pupils are equal, round, and reactive to light  Cardiovascular:      Rate and Rhythm: Normal rate and regular rhythm  Heart sounds: Normal heart sounds  Pulmonary:      Effort: Pulmonary effort is normal       Breath sounds: Normal breath sounds  Abdominal:      General: Bowel sounds are normal       Palpations: Abdomen is soft  Musculoskeletal:         General: Normal range of motion  Cervical back: Normal range of motion  Skin:     General: Skin is warm and dry  Neurological:      Mental Status: He is alert and oriented to person, place, and time            Data:     Laboratory Results: I have personally reviewed the pertinent laboratory results/reports     Kelly Bell, [address] [place] [address]

## 2022-11-17 ENCOUNTER — TELEPHONE (OUTPATIENT)
Dept: INTERNAL MEDICINE CLINIC | Facility: CLINIC | Age: 42
End: 2022-11-17

## 2022-11-17 LAB
EST. AVERAGE GLUCOSE BLD GHB EST-MCNC: 111 MG/DL
HBA1C MFR BLD: 5.5 %

## 2022-11-17 NOTE — TELEPHONE ENCOUNTER
----- Message from [id number] sent at 11/17/2022 12:41 PM EST -----  Labs are all normal

## 2023-11-01 ENCOUNTER — OFFICE VISIT (OUTPATIENT)
Dept: UROLOGY | Facility: CLINIC | Age: 43
End: 2023-11-01
Payer: COMMERCIAL

## 2023-11-01 VITALS
BODY MASS INDEX: 27.4 KG/M2 | WEIGHT: 191.4 LBS | SYSTOLIC BLOOD PRESSURE: 124 MMHG | HEART RATE: 87 BPM | DIASTOLIC BLOOD PRESSURE: 76 MMHG | OXYGEN SATURATION: 99 % | HEIGHT: 70 IN

## 2023-11-01 DIAGNOSIS — F52.4 PREMATURE EJACULATION: Primary | ICD-10-CM

## 2023-11-01 PROCEDURE — 99203 OFFICE O/P NEW LOW 30 MIN: CPT | Performed by: PHYSICIAN ASSISTANT

## 2023-11-01 RX ORDER — TRAZODONE HYDROCHLORIDE 150 MG/1
150 TABLET ORAL
COMMUNITY
Start: 2023-08-06 | End: 2023-11-01 | Stop reason: ALTCHOICE

## 2023-11-01 RX ORDER — PAROXETINE HYDROCHLORIDE 40 MG/1
TABLET, FILM COATED ORAL
Qty: 30 TABLET | Refills: 2 | Status: SHIPPED | OUTPATIENT
Start: 2023-11-01

## 2023-11-01 RX ORDER — QUETIAPINE FUMARATE 200 MG/1
200 TABLET, FILM COATED ORAL
COMMUNITY
Start: 2023-10-25

## 2023-11-01 NOTE — PROGRESS NOTES
11/1/2023      Chief Complaint   Patient presents with    New Patient Visit     Premature ejaculation     Assessment and Plan    Premature ejaculation   - Previously did well with prn Paxil  - Rx refill for Paxil 40 mg 3-4 hours prior to intercourse provided. - Follow up PRN, can have refilled through PCP as long as this remains effective. History of Present Illness  Evangelina Sales is a 44 y.o. male here for new patient evaluation of premature ejaculation. Previously was seen for this same issue in 2019. Previously failed Emla cream, viagra, and behavioral modifications in the past. Was utilizing as needed paxil in the past with good benefit and would like to restart this. Review of Systems   Constitutional:  Negative for chills and fever. Respiratory:  Negative for shortness of breath. Cardiovascular:  Negative for chest pain. Gastrointestinal:  Negative for abdominal pain. Genitourinary:  Negative for difficulty urinating, dysuria, flank pain, frequency, hematuria and urgency. Neurological:  Negative for dizziness.                   Past Medical History  Past Medical History:   Diagnosis Date    Substance abuse [facility])        Past Social History  Past Surgical History:   Procedure Laterality Date    WISDOM TOOTH EXTRACTION       Social History     Tobacco Use   Smoking Status Every Day    Packs/day: 1.00    Types: Cigarettes    Start date: 1/1/2000   Smokeless Tobacco Never       Past Family History  Family History   Problem Relation Age of Onset    Cancer Maternal Grandmother        Past Social history  Social History     Socioeconomic History    Marital status: Single     Spouse name: Not on file    Number of children: Not on file    Years of education: Not on file    Highest education level: Not on file   Occupational History    Not on file   Tobacco Use    Smoking status: Every Day     Packs/day: 1.00     Types: Cigarettes     Start date: 1/1/2000    Smokeless tobacco: Never   Vaping Use    Vaping Use: Every day    Substances: Nicotine   Substance and Sexual Activity    Alcohol use: No    Drug use: No    Sexual activity: Yes     Partners: Female   Other Topics Concern    Not on file   Social History Narrative    Not on file     Social Determinants of Health     Financial Resource Strain: Not on file   Food Insecurity: Not on file   Transportation Needs: Not on file   Physical Activity: Not on file   Stress: Not on file   Social Connections: Not on file   Intimate Partner Violence: Not on file   Housing Stability: Not on file       Current Medications  Current Outpatient Medications   Medication Sig Dispense Refill    amphetamine-dextroamphetamine (ADDERALL XR) 25 MG 24 hr capsule Take 25 mg by mouth every morning      amphetamine-dextroamphetamine (ADDERALL) 10 mg tablet take 1 tablet by mouth AT 4PM      buprenorphine-naloxone (Suboxone) 2-0.5 mg DISSOLVE 1 AND A HALF FILMS UDNER THE TONGUE DAILY      omeprazole (PriLOSEC) 20 mg delayed release capsule Take 1 capsule (20 mg total) by mouth daily 30 capsule 3    PARoxetine (PAXIL) 40 MG tablet Take 1 tablet by mouth 3-4 hours prior to intercourse PRN 30 tablet 2    QUEtiapine (SEROquel) 200 mg tablet Take 200 mg by mouth daily at bedtime      sildenafil (VIAGRA) 50 MG tablet Take 1 to 2 tablets by mouth one (1) hour prior to intercourse on an empty stomach. 30 tablet 3     No current facility-administered medications for this visit. Allergies  No Known Allergies      The following portions of the patient's history were reviewed and updated as appropriate: allergies, current medications, past medical history, past social history, past surgical history and problem list.      Vitals  Vitals:    11/01/23 1149   BP: 124/76   Pulse: 87   SpO2: 99%   Weight: 86.8 kg (191 lb 6.4 oz)   Height: 5' 9.5" (1.765 m)           Physical Exam  Physical Exam  Constitutional:       Appearance: Normal appearance.    HENT:      Head: Normocephalic and atraumatic. Right Ear: External ear normal.      Left Ear: External ear normal.      Nose: Nose normal.   Eyes:      General: No scleral icterus. Conjunctiva/sclera: Conjunctivae normal.   Cardiovascular:      Pulses: Normal pulses. Pulmonary:      Effort: Pulmonary effort is normal.   Musculoskeletal:         General: Normal range of motion. Cervical back: Normal range of motion. Neurological:      General: No focal deficit present. Mental Status: He is alert and oriented to person, place, and time. Psychiatric:         Mood and Affect: Mood normal.         Behavior: Behavior normal.         Thought Content: Thought content normal.         Judgment: Judgment normal.           Results  No results found for this or any previous visit (from the past 1 hour(s)). ]  No results found for: "PSA"  Lab Results   Component Value Date    CALCIUM 9.2 11/16/2022    K 4.6 11/16/2022    CO2 30 11/16/2022     11/16/2022    BUN 28 (H) 11/16/2022    CREATININE 1.30 11/16/2022     Lab Results   Component Value Date    WBC 7.17 11/16/2022    HGB 13.7 11/16/2022    HCT 42.5 11/16/2022    MCV 97 11/16/2022     11/16/2022           Orders  No orders of the defined types were placed in this encounter.       Moises Alfaro

## 2024-01-28 DIAGNOSIS — F52.4 PREMATURE EJACULATION: ICD-10-CM

## 2024-01-29 RX ORDER — PAROXETINE HYDROCHLORIDE 40 MG/1
TABLET, FILM COATED ORAL
Qty: 30 TABLET | Refills: 5 | Status: SHIPPED | OUTPATIENT
Start: 2024-01-29